# Patient Record
Sex: FEMALE | Employment: UNEMPLOYED | URBAN - METROPOLITAN AREA
[De-identification: names, ages, dates, MRNs, and addresses within clinical notes are randomized per-mention and may not be internally consistent; named-entity substitution may affect disease eponyms.]

---

## 2017-05-18 ENCOUNTER — MED REC SCAN ONLY (OUTPATIENT)
Dept: FAMILY MEDICINE CLINIC | Facility: CLINIC | Age: 32
End: 2017-05-18

## 2020-01-24 ENCOUNTER — GENETICS ENCOUNTER (OUTPATIENT)
Dept: GENETICS | Facility: HOSPITAL | Age: 35
End: 2020-01-24
Attending: GENETIC COUNSELOR, MS
Payer: COMMERCIAL

## 2020-01-24 NOTE — PROGRESS NOTES
Referring Provider:       family referral (mother – Kacey Herrera)     Additional Provider:     Glory Díaz MD     Reason for Referral:  [facility] was referred for genetic counseling because of a family history of breast cancer.   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 Genetic Testing:   Since a pathogenic variant has already been identified in the family, Ms. Pena should be tested for this specific variant.  The pros, cons, and limitations of genetic nida and no first-degree relative with colorectal cancer to have a screening colonoscopy every 5 years, beginning at age 36.   I am not aware of any evidence-based guidelines that address surveillance for cancers other than breast and colorectal cancers in CHEK2

## 2020-02-04 ENCOUNTER — TELEPHONE (OUTPATIENT)
Dept: HEMATOLOGY/ONCOLOGY | Facility: HOSPITAL | Age: 35
End: 2020-02-04

## 2020-02-16 ENCOUNTER — GENETICS ENCOUNTER (OUTPATIENT)
Dept: HEMATOLOGY/ONCOLOGY | Facility: HOSPITAL | Age: 35
End: 2020-02-16

## 2020-02-16 NOTE — PROGRESS NOTES
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 In the meantime, Ms. Pena was encouraged to call me with any questions regarding these res

## 2021-08-24 ENCOUNTER — GENETICS ENCOUNTER (OUTPATIENT)
Dept: HEMATOLOGY/ONCOLOGY | Facility: HOSPITAL | Age: 36
End: 2021-08-24

## 2021-08-24 NOTE — PROGRESS NOTES
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 No change in discussion from 2020.

## 2022-12-19 NOTE — TELEPHONE ENCOUNTER
Patient calling regarding results for her genetic counseling. Please call her back when you can.  Thank you [General Appearance - Alert] : alert [Sclera] : the sclera and conjunctiva were normal [Outer Ear] : the ears and nose were normal in appearance [Neck Appearance] : the appearance of the neck was normal [] : no respiratory distress [Heart Sounds] : normal S1 and S2 [Abdomen Tenderness] : non-tender [Abnormal Walk] : normal gait [Skin Color & Pigmentation] : normal skin color and pigmentation [Oriented To Time, Place, And Person] : oriented to person, place, and time [Affect] : the affect was normal [Mood] : the mood was normal

## 2024-01-18 ENCOUNTER — PATIENT OUTREACH (OUTPATIENT)
Dept: CASE MANAGEMENT | Age: 39
End: 2024-01-18

## 2024-01-18 NOTE — PROCEDURES
The office order for PCP removal request is Approved and finalized on January 18, 2024.    Thanks,  [facility] Team